# Patient Record
Sex: MALE | Race: WHITE | Employment: FULL TIME | URBAN - NONMETROPOLITAN AREA
[De-identification: names, ages, dates, MRNs, and addresses within clinical notes are randomized per-mention and may not be internally consistent; named-entity substitution may affect disease eponyms.]

---

## 2021-04-08 ENCOUNTER — HOSPITAL ENCOUNTER (OUTPATIENT)
Dept: INTERVENTIONAL RADIOLOGY/VASCULAR | Age: 31
Discharge: HOME | End: 2021-04-10
Payer: COMMERCIAL

## 2021-04-08 ENCOUNTER — OFFICE VISIT (OUTPATIENT)
Dept: PRIMARY CARE CLINIC | Age: 31
End: 2021-04-08
Payer: COMMERCIAL

## 2021-04-08 VITALS
BODY MASS INDEX: 31.74 KG/M2 | OXYGEN SATURATION: 97 % | WEIGHT: 260.6 LBS | HEIGHT: 76 IN | HEART RATE: 97 BPM | SYSTOLIC BLOOD PRESSURE: 143 MMHG | DIASTOLIC BLOOD PRESSURE: 91 MMHG | TEMPERATURE: 98.7 F | RESPIRATION RATE: 17 BRPM

## 2021-04-08 DIAGNOSIS — M79.604 RIGHT LEG PAIN: ICD-10-CM

## 2021-04-08 DIAGNOSIS — M79.89 RIGHT LEG SWELLING: ICD-10-CM

## 2021-04-08 DIAGNOSIS — R03.0 ELEVATED BLOOD PRESSURE READING: ICD-10-CM

## 2021-04-08 DIAGNOSIS — S86.801A: Primary | ICD-10-CM

## 2021-04-08 PROBLEM — E66.09 CLASS 1 OBESITY DUE TO EXCESS CALORIES WITHOUT SERIOUS COMORBIDITY WITH BODY MASS INDEX (BMI) OF 31.0 TO 31.9 IN ADULT: Status: ACTIVE | Noted: 2021-04-08

## 2021-04-08 PROBLEM — Z72.0 TOBACCO USE: Status: ACTIVE | Noted: 2021-04-08

## 2021-04-08 PROCEDURE — 93971 EXTREMITY STUDY: CPT

## 2021-04-08 PROCEDURE — 99203 OFFICE O/P NEW LOW 30 MIN: CPT | Performed by: NURSE PRACTITIONER

## 2021-04-08 RX ORDER — PREDNISONE 20 MG/1
40 TABLET ORAL DAILY
Qty: 14 TABLET | Refills: 0 | Status: SHIPPED | OUTPATIENT
Start: 2021-04-08 | End: 2021-04-15

## 2021-04-08 SDOH — HEALTH STABILITY: MENTAL HEALTH: HOW MANY STANDARD DRINKS CONTAINING ALCOHOL DO YOU HAVE ON A TYPICAL DAY?: NOT ASKED

## 2021-04-08 SDOH — HEALTH STABILITY: MENTAL HEALTH: HOW OFTEN DO YOU HAVE A DRINK CONTAINING ALCOHOL?: NEVER

## 2021-04-08 ASSESSMENT — PATIENT HEALTH QUESTIONNAIRE - PHQ9
2. FEELING DOWN, DEPRESSED OR HOPELESS: 0
SUM OF ALL RESPONSES TO PHQ QUESTIONS 1-9: 0
SUM OF ALL RESPONSES TO PHQ QUESTIONS 1-9: 0

## 2021-04-08 NOTE — PROGRESS NOTES
[address]  [facility]  [address] [facility], [place] Joan Mosquera   Phone: [phone]  Fax: [phone]    Date: 4/8/2021   Patient:  Minor Martinez   YOB: 1990 Age: 33 y.o. MRN: [id number]   PCP: No primary care provider on file. Subjective:    Chief Complaint   Patient presents with    Leg Swelling     Swelling and Pain. No injury but woke up felt something in back of knee. Sx started 4.6.2021       HPI: Patient presents with complaints of right knee and lower leg pain and swelling for the past 2 days. He states it started with pain in the posterior knee, he then went hiking strenuously for several hours and the symptoms have progressively worsened. He rates the pain a 5 on a pain scale of 0-10, is an ache, nonradiating. They deny any distal paresthesia or previous surgeries on this leg. They have tried elevating it (overnight only), motrin (occasionally), and ice without relief. He has been on his feet a lot lately during the day. He denies any previous DVT or PE. He had a covid-like illness in late January of 2021, but tested negative for covid-19 at that time. He is an active smoker. His blood pressure is high today -- he reports a history of high blood pressure readings over the past few years. He does not follow a low sodium diet. He is requesting to start a blood pressure medication today. He denies any symptoms from his hypertension. All other review of systems negative. History is obtained from the patient and previous medical records, including any pertinent recent lab/imaging results in EMR and/or Care Everywhere (external results), encounter notes available in EMR, and encounter notes available in Care Everywhere (external notes). Significant family, medical, surgical, and social history reviewed.       Patient Active Problem List   Diagnosis    Tobacco use    Class 1 obesity due to excess calories without serious comorbidity with body mass index (BMI) of This Encounter   Medications    predniSONE (DELTASONE) 20 MG tablet     Sig: Take 2 tablets by mouth daily for 7 days     Dispense:  14 tablet     Refill:  0    metoprolol tartrate (LOPRESSOR) 25 MG tablet     Sig: Take 1 tablet by mouth daily     Dispense:  30 tablet     Refill:  1       Ultrasound ordered to rule out DVT -- was negative. Radiologist suggesting plantaris tendon injury. Advised ice, rest, elevate, and gentle compression with ace wrap. F/u with PCP prn. He is requesting an antihypertensive medication today. He has not had any recent blood work done. Encouraged to follow a low sodium diet, monitor his blood pressure at home, and f/u with his PCP. Education provided. Typical injury/condition duration and progression reviewed. Treatment options discussed. Follow up with PCP, sooner as needed. Return or go to an urgent care or emergency room if symptoms worsen, fail to improve, or new symptoms arise. The use, risks, benefits, and side effects of prescribed or recommended medications were discussed. If any testing was ordered at this visit, the patient was educated regarding result interpretation. All questions were answered and the patient/caregiver voiced understanding.          Electronically signed by Jake CHAUDHRY, NPShannanC, FNP-BC on 4/8/2021 at 3:55 PM  Internal Medicine

## 2021-04-08 NOTE — PATIENT INSTRUCTIONS
Patient Education        Elevated Blood Pressure: Care Instructions  Your Care Instructions    Blood pressure is a measure of how hard the blood pushes against the walls of your arteries. It's normal for blood pressure to go up and down throughout the day. But if it stays up over time, you have high blood pressure. Two numbers tell you your blood pressure. The first number is the systolic pressure. It shows how hard the blood pushes when your heart is pumping. The second number is the diastolic pressure. It shows how hard the blood pushes between heartbeats, when your heart is relaxed and filling with blood. An ideal blood pressure in adults is less than 120/80 (say \"120 over 80\"). High blood pressure is 140/90 or higher. You have high blood pressure if your top number is 140 or higher or your bottom number is 90 or higher, or both. The main test for high blood pressure is simple, fast, and painless. To diagnose high blood pressure, your doctor will test your blood pressure at different times. After testing your blood pressure, your doctor may ask you to test it again when you are home. If you are diagnosed with high blood pressure, you can work with your doctor to make a long-term plan to manage it. Follow-up care is a key part of your treatment and safety. Be sure to make and go to all appointments, and call your doctor if you are having problems. It's also a good idea to know your test results and keep a list of the medicines you take. How can you care for yourself at home? · Do not smoke. Smoking increases your risk for heart attack and stroke. If you need help quitting, talk to your doctor about stop-smoking programs and medicines. These can increase your chances of quitting for good. · Stay at a healthy weight. · Try to limit how much sodium you eat to less than 2,300 milligrams (mg) a day. Your doctor may ask you to try to eat less than 1,500 mg a day. · Be physically active.  Get at least 30 minutes of exercise on most days of the week. Walking is a good choice. You also may want to do other activities, such as running, swimming, cycling, or playing tennis or team sports. · Avoid or limit alcohol. Talk to your doctor about whether you can drink any alcohol. · Eat plenty of fruits, vegetables, and low-fat dairy products. Eat less saturated and total fats. · Learn how to check your blood pressure at home. When should you call for help? Call your doctor now or seek immediate medical care if:    · Your blood pressure is much higher than normal (such as 180/110 or higher).     · You think high blood pressure is causing symptoms such as:  ¨ Severe headache. ¨ Blurry vision.    Watch closely for changes in your health, and be sure to contact your doctor if:    · You do not get better as expected. Where can you learn more? Go to https://[employer]Hot[employer]. org and sign in to your [employer] account. Enter D444 in the Campus Explorer box to learn more about \"Elevated Blood Pressure: Care Instructions. \"     If you do not have an account, please click on the \"Sign Up Now\" link. Current as of: May 10, 2017  Content Version: 11.6  © 3686-2931 [employer]. Care instructions adapted under license by [facility] ([facility]). If you have questions about a medical condition or this instruction, always ask your healthcare professional. [address] any warranty or liability for your use of this information.